# Patient Record
Sex: MALE | Race: BLACK OR AFRICAN AMERICAN | Employment: FULL TIME | URBAN - METROPOLITAN AREA
[De-identification: names, ages, dates, MRNs, and addresses within clinical notes are randomized per-mention and may not be internally consistent; named-entity substitution may affect disease eponyms.]

---

## 2017-03-03 PROBLEM — Z71.6 TOBACCO ABUSE COUNSELING: Status: ACTIVE | Noted: 2017-03-03

## 2017-03-03 PROBLEM — J44.9 CHRONIC OBSTRUCTIVE PULMONARY DISEASE (HCC): Status: ACTIVE | Noted: 2017-03-03

## 2017-03-03 PROBLEM — I10 ESSENTIAL HYPERTENSION: Status: ACTIVE | Noted: 2017-03-03

## 2017-03-03 PROBLEM — Z72.0 TOBACCO ABUSE DISORDER: Status: ACTIVE | Noted: 2017-03-03

## 2017-03-03 PROBLEM — F10.10 ALCOHOL USE DISORDER, MILD, ABUSE: Status: ACTIVE | Noted: 2017-03-03

## 2017-04-05 PROBLEM — I37.1 PULMONARY VALVE INSUFFICIENCY: Status: ACTIVE | Noted: 2017-04-05

## 2017-04-05 PROBLEM — R79.89 ELEVATED LFTS: Status: ACTIVE | Noted: 2017-04-05

## 2024-10-02 ENCOUNTER — OFFICE VISIT (OUTPATIENT)
Dept: INTERNAL MEDICINE CLINIC | Age: 44
End: 2024-10-02

## 2024-10-02 VITALS
BODY MASS INDEX: 19.77 KG/M2 | WEIGHT: 123 LBS | SYSTOLIC BLOOD PRESSURE: 126 MMHG | HEART RATE: 91 BPM | HEIGHT: 66 IN | OXYGEN SATURATION: 98 % | DIASTOLIC BLOOD PRESSURE: 78 MMHG

## 2024-10-02 DIAGNOSIS — R19.7 DIARRHEA, UNSPECIFIED TYPE: ICD-10-CM

## 2024-10-02 DIAGNOSIS — Z13.1 SCREENING FOR DIABETES MELLITUS: ICD-10-CM

## 2024-10-02 DIAGNOSIS — R53.83 OTHER FATIGUE: ICD-10-CM

## 2024-10-02 DIAGNOSIS — Z12.5 SCREENING PSA (PROSTATE SPECIFIC ANTIGEN): ICD-10-CM

## 2024-10-02 DIAGNOSIS — F10.10 ALCOHOL ABUSE: ICD-10-CM

## 2024-10-02 DIAGNOSIS — R39.15 URINARY URGENCY: ICD-10-CM

## 2024-10-02 DIAGNOSIS — Z13.220 SCREENING FOR HYPERLIPIDEMIA: Primary | ICD-10-CM

## 2024-10-02 DIAGNOSIS — R35.89 POLYURIA: ICD-10-CM

## 2024-10-02 SDOH — ECONOMIC STABILITY: INCOME INSECURITY: HOW HARD IS IT FOR YOU TO PAY FOR THE VERY BASICS LIKE FOOD, HOUSING, MEDICAL CARE, AND HEATING?: PATIENT DECLINED

## 2024-10-02 SDOH — ECONOMIC STABILITY: FOOD INSECURITY: WITHIN THE PAST 12 MONTHS, THE FOOD YOU BOUGHT JUST DIDN'T LAST AND YOU DIDN'T HAVE MONEY TO GET MORE.: PATIENT DECLINED

## 2024-10-02 SDOH — ECONOMIC STABILITY: FOOD INSECURITY: WITHIN THE PAST 12 MONTHS, YOU WORRIED THAT YOUR FOOD WOULD RUN OUT BEFORE YOU GOT MONEY TO BUY MORE.: SOMETIMES TRUE

## 2024-10-02 ASSESSMENT — PATIENT HEALTH QUESTIONNAIRE - PHQ9
SUM OF ALL RESPONSES TO PHQ QUESTIONS 1-9: 2
2. FEELING DOWN, DEPRESSED OR HOPELESS: SEVERAL DAYS
1. LITTLE INTEREST OR PLEASURE IN DOING THINGS: SEVERAL DAYS
SUM OF ALL RESPONSES TO PHQ QUESTIONS 1-9: 2
SUM OF ALL RESPONSES TO PHQ QUESTIONS 1-9: 2
SUM OF ALL RESPONSES TO PHQ9 QUESTIONS 1 & 2: 2
SUM OF ALL RESPONSES TO PHQ QUESTIONS 1-9: 2

## 2024-10-02 NOTE — PROGRESS NOTES
[facility]  [address] [address]  Dept: [phone]  Dept Fax: [phone]     Name: Gerard Wadsworth  : 1980           Chief Complaint   Patient presents with    New Patient    Urinary Urgency     Urgency/Frequency present for 2 years, no pain    Urinary Frequency    GI Problem     Diarrhea for about a year 3/4 times a day        History of Present Illness:    HPI  Patient came to establish care  Patient complains of urinary urgency and frequency from last 2 years, no fever or chills,  Also complaining of nocturia  Will check PSA,    Does have family history of cancer does not know what kind of cancer   Not in contact    Also having diarrhea 3-4 bowel movements daily, no abdominal pain no weight loss, no blood in the stool, going on from last year, gets cramping followed by diarrhea,    Drinks alcohol daily, does not know if he goes in withdrawal, since he drinks every day,      Past Medical History:    Past Medical History:   Diagnosis Date    Tobacco abuse       Reviewed all health maintenance requirements and ordered appropriate tests  Health Maintenance Due   Topic Date Due    Pneumococcal 0-64 years Vaccine (1 of 2 - PCV) Never done    Depression Screen  Never done    Varicella vaccine (1 of 2 - 13+ 2-dose series) Never done    HIV screen  Never done    Hepatitis C screen  Never done    Hepatitis B vaccine (1 of 3 - 19+ 3-dose series) Never done    DTaP/Tdap/Td vaccine (1 - Tdap) Never done    Lipids  2022    Flu vaccine (1) Never done    COVID-19 Vaccine ( - -24 season) Never done       Past Surgical History:    No past surgical history on file.     Medications:      Current Outpatient Medications:     vitamin D (ERGOCALCIFEROL) 11246 UNITS CAPS capsule, Take 1 capsule by mouth once a week (Patient not taking: Reported on 10/2/2024), Disp: 4 capsule, Rfl: 5    varenicline (CHANTIX CONTINUING MONTH ) 1 MG tablet, Take 1 tablet by

## 2024-10-02 NOTE — PROGRESS NOTES
\"Have you been to the ER, urgent care clinic since your last visit?  Hospitalized since your last visit?\"    NO

## 2024-10-09 ENCOUNTER — HOSPITAL ENCOUNTER (OUTPATIENT)
Age: 44
Setting detail: SPECIMEN
Discharge: HOME | End: 2024-10-09

## 2024-10-09 DIAGNOSIS — Z13.1 SCREENING FOR DIABETES MELLITUS: ICD-10-CM

## 2024-10-09 DIAGNOSIS — Z13.220 SCREENING FOR HYPERLIPIDEMIA: ICD-10-CM

## 2024-10-09 DIAGNOSIS — R53.83 OTHER FATIGUE: ICD-10-CM

## 2024-10-09 DIAGNOSIS — Z12.5 SCREENING PSA (PROSTATE SPECIFIC ANTIGEN): ICD-10-CM

## 2024-10-09 DIAGNOSIS — R39.15 URINARY URGENCY: ICD-10-CM

## 2024-10-09 DIAGNOSIS — R19.7 DIARRHEA, UNSPECIFIED TYPE: ICD-10-CM

## 2024-10-09 DIAGNOSIS — R35.89 POLYURIA: ICD-10-CM

## 2024-10-09 DIAGNOSIS — F10.10 ALCOHOL ABUSE: ICD-10-CM

## 2024-10-09 LAB
ALBUMIN SERPL-MCNC: 4.9 G/DL (ref 3.5–5.2)
ALBUMIN/GLOB SERPL: 2 {RATIO} (ref 1–2.5)
ALP SERPL-CCNC: 46 U/L (ref 40–129)
ALT SERPL-CCNC: 24 U/L (ref 10–50)
ANION GAP SERPL CALCULATED.3IONS-SCNC: 13 MMOL/L (ref 9–16)
AST SERPL-CCNC: 35 U/L (ref 10–50)
BASOPHILS # BLD: <0.03 K/UL (ref 0–0.2)
BASOPHILS NFR BLD: 0 % (ref 0–2)
BILIRUB SERPL-MCNC: 0.6 MG/DL (ref 0–1.2)
BUN SERPL-MCNC: 14 MG/DL (ref 6–20)
CALCIUM SERPL-MCNC: 10.1 MG/DL (ref 8.6–10.4)
CHLORIDE SERPL-SCNC: 103 MMOL/L (ref 98–107)
CHOLEST SERPL-MCNC: 156 MG/DL (ref 0–199)
CHOLESTEROL/HDL RATIO: 2
CO2 SERPL-SCNC: 25 MMOL/L (ref 20–31)
CREAT SERPL-MCNC: 1.2 MG/DL (ref 0.7–1.2)
EOSINOPHIL # BLD: 0.03 K/UL (ref 0–0.44)
EOSINOPHILS RELATIVE PERCENT: 1 % (ref 1–4)
ERYTHROCYTE [DISTWIDTH] IN BLOOD BY AUTOMATED COUNT: 12.1 % (ref 11.8–14.4)
FOLATE SERPL-MCNC: 11.4 NG/ML (ref 4.8–24.2)
GFR, ESTIMATED: 78 ML/MIN/1.73M2
GLUCOSE P FAST SERPL-MCNC: 75 MG/DL (ref 74–99)
GLUCOSE SERPL-MCNC: 75 MG/DL (ref 74–99)
HCT VFR BLD AUTO: 45 % (ref 40.7–50.3)
HDLC SERPL-MCNC: 70 MG/DL
HGB BLD-MCNC: 15.1 G/DL (ref 13–17)
IMM GRANULOCYTES # BLD AUTO: <0.03 K/UL (ref 0–0.3)
IMM GRANULOCYTES NFR BLD: 0 %
LDLC SERPL CALC-MCNC: 78 MG/DL (ref 0–100)
LYMPHOCYTES NFR BLD: 2.8 K/UL (ref 1.1–3.7)
LYMPHOCYTES RELATIVE PERCENT: 50 % (ref 24–43)
MCH RBC QN AUTO: 31 PG (ref 25.2–33.5)
MCHC RBC AUTO-ENTMCNC: 33.6 G/DL (ref 28.4–34.8)
MCV RBC AUTO: 92.4 FL (ref 82.6–102.9)
MONOCYTES NFR BLD: 0.5 K/UL (ref 0.1–1.2)
MONOCYTES NFR BLD: 9 % (ref 3–12)
NEUTROPHILS NFR BLD: 40 % (ref 36–65)
NEUTS SEG NFR BLD: 2.21 K/UL (ref 1.5–8.1)
NRBC BLD-RTO: 0 PER 100 WBC
PLATELET # BLD AUTO: 199 K/UL (ref 138–453)
PMV BLD AUTO: 9 FL (ref 8.1–13.5)
POTASSIUM SERPL-SCNC: 4.5 MMOL/L (ref 3.7–5.3)
PROT SERPL-MCNC: 7.6 G/DL (ref 6.6–8.7)
RBC # BLD AUTO: 4.87 M/UL (ref 4.21–5.77)
SODIUM SERPL-SCNC: 141 MMOL/L (ref 136–145)
TRIGL SERPL-MCNC: 42 MG/DL (ref 0–149)
TSH SERPL DL<=0.05 MIU/L-ACNC: 1.34 UIU/ML (ref 0.27–4.2)
VIT B12 SERPL-MCNC: 642 PG/ML (ref 232–1245)
VLDLC SERPL CALC-MCNC: 8 MG/DL
WBC OTHER # BLD: 5.6 K/UL (ref 3.5–11.3)

## 2024-10-10 LAB
CAMPYLOBACTER DNA SPEC NAA+PROBE: NORMAL
ETEC ELTA+ESTB GENES STL QL NAA+PROBE: NORMAL
LACTOFERRIN STL QL: NORMAL
P SHIGELLOIDES DNA STL QL NAA+PROBE: NORMAL
SALMONELLA DNA SPEC QL NAA+PROBE: NORMAL
SHIGA TOXIN STX GENE SPEC NAA+PROBE: NORMAL
SHIGELLA DNA SPEC QL NAA+PROBE: NORMAL
SPECIMEN DESCRIPTION: NORMAL
V CHOL+PARA RFBL+TRKH+TNAA STL QL NAA+PR: NORMAL
Y ENTERO RECN STL QL NAA+PROBE: NORMAL

## 2024-10-11 LAB — FECAL PANCREATIC ELASTASE-1: 345 UG/G

## 2024-10-13 LAB
GLIADIN IGA SER IA-ACNC: 4.5 U/ML
GLIADIN IGG SER IA-ACNC: 3.1 U/ML
IGA SERPL-MCNC: 306 MG/DL (ref 70–400)
TTG IGA SER IA-ACNC: 0.6 U/ML

## 2024-10-30 ENCOUNTER — OFFICE VISIT (OUTPATIENT)
Dept: INTERNAL MEDICINE CLINIC | Age: 44
End: 2024-10-30
Payer: COMMERCIAL

## 2024-10-30 VITALS
OXYGEN SATURATION: 98 % | SYSTOLIC BLOOD PRESSURE: 126 MMHG | HEART RATE: 72 BPM | WEIGHT: 121 LBS | BODY MASS INDEX: 19.44 KG/M2 | DIASTOLIC BLOOD PRESSURE: 82 MMHG | HEIGHT: 66 IN

## 2024-10-30 DIAGNOSIS — R10.84 GENERALIZED ABDOMINAL PAIN: ICD-10-CM

## 2024-10-30 DIAGNOSIS — R39.15 URINARY URGENCY: Primary | ICD-10-CM

## 2024-10-30 DIAGNOSIS — R63.4 WEIGHT LOSS: ICD-10-CM

## 2024-10-30 PROCEDURE — 99213 OFFICE O/P EST LOW 20 MIN: CPT | Performed by: INTERNAL MEDICINE

## 2024-10-30 PROCEDURE — 3079F DIAST BP 80-89 MM HG: CPT | Performed by: INTERNAL MEDICINE

## 2024-10-30 PROCEDURE — 3074F SYST BP LT 130 MM HG: CPT | Performed by: INTERNAL MEDICINE

## 2024-10-30 NOTE — PROGRESS NOTES
\"Have you been to the ER, urgent care clinic since your last visit?  Hospitalized since your last visit?\"    NO    “Have you seen or consulted any other health care providers outside our system since your last visit?”    NO            
motion intact,  Cardiovascular : Regular rate and rhythm, no murmur appreciated  Respiratory : Bilateral clear breath sounds, no wheezing crackles appreciated  Gastrointestinal  : Abdomen soft, nontender, bowel sounds present  Extremities : No pedal edema clubbing or cyanosis present  Skin : Warm and dry, no skin lesions appreciated  Psych : Mood normal       Lab Results   Component Value Date    LABA1C 5.3 03/07/2017     No results found for: \"EAG\"   LABORATORY FINDINGS:    CBC:  Lab Results   Component Value Date/Time    WBC 5.6 10/09/2024 04:20 PM    HGB 15.1 10/09/2024 04:20 PM     10/09/2024 04:20 PM       BMP:    Lab Results   Component Value Date/Time     10/09/2024 04:20 PM    K 4.5 10/09/2024 04:20 PM     10/09/2024 04:20 PM    CO2 25 10/09/2024 04:20 PM    BUN 14 10/09/2024 04:20 PM    CREATININE 1.2 10/09/2024 04:20 PM    GLUCOSE 75 10/09/2024 04:20 PM       HEMOGLOBIN A1C:   Lab Results   Component Value Date/Time    LABA1C 5.3 03/07/2017 12:00 AM       FASTING LIPID PANEL:  Lab Results   Component Value Date    CHOL 170 03/07/2017    HDL 70 10/09/2024    TRIG 41 03/07/2017       No results found for: \"TSHREFFT4\"     No valid procedures specified.     ASSESSMENT AND PLAN:      Diagnoses and all orders for this visit:  Urinary urgency  -     Urinalysis with Reflex to Culture; Future  -     PSA Screening; Future  Generalized abdominal pain  -     CT ABDOMEN PELVIS WO CONTRAST Additional Contrast? None; Future  Weight loss  -     Urinalysis with Reflex to Culture; Future  -     PSA Screening; Future  -     CT ABDOMEN PELVIS WO CONTRAST Additional Contrast? None; Future       FOLLOW UP AND INSTRUCTIONS:   No follow-ups on file.    Gerard received counseling on the following healthy behaviors: exercise    Discussed use, benefit, and side effects of prescribed medications.  Barriers to medication compliance addressed.  All patient questions answered.  Pt voiced understanding.     Patient

## 2024-11-11 ENCOUNTER — OFFICE VISIT (OUTPATIENT)
Dept: GASTROENTEROLOGY | Age: 44
End: 2024-11-11
Payer: COMMERCIAL

## 2024-11-11 VITALS
DIASTOLIC BLOOD PRESSURE: 102 MMHG | HEIGHT: 66 IN | SYSTOLIC BLOOD PRESSURE: 147 MMHG | WEIGHT: 123 LBS | BODY MASS INDEX: 19.77 KG/M2 | HEART RATE: 65 BPM

## 2024-11-11 DIAGNOSIS — R14.0 ABDOMINAL BLOATING: Primary | ICD-10-CM

## 2024-11-11 DIAGNOSIS — R19.4 ALTERED BOWEL HABITS: ICD-10-CM

## 2024-11-11 PROCEDURE — 3080F DIAST BP >= 90 MM HG: CPT | Performed by: INTERNAL MEDICINE

## 2024-11-11 PROCEDURE — 99204 OFFICE O/P NEW MOD 45 MIN: CPT | Performed by: INTERNAL MEDICINE

## 2024-11-11 PROCEDURE — 3077F SYST BP >= 140 MM HG: CPT | Performed by: INTERNAL MEDICINE

## 2024-11-11 RX ORDER — POLYETHYLENE GLYCOL 3350 17 G/17G
POWDER, FOR SOLUTION ORAL
Qty: 255 G | Refills: 0 | Status: SHIPPED | OUTPATIENT
Start: 2024-11-11

## 2024-11-11 NOTE — PROGRESS NOTES
Reason for Referral:   Pennie Stevenson MD  [address] [place] [address]    Chief Complaint   Patient presents with    New Patient           HISTORY OF PRESENT ILLNESS: Mr.Reginald TRUNG Wadsworth is a 44 y.o. male with a no significant past history, referred for evaluation of altered bowel habits.    He is c/o- abdominal discomfort with bloating for several years. Also c/o- frequent BM- 3 times a day.  He drinks alcohol daily- 6-packs/ day and 12 pack on weekends.  Also smokes 3 cigars everyday.    No c/o- nausea, vomiting, fever, loss of appetite, weight loss, bloating, bleeding NV, diarrhea, nocturnal diarrhea, tenesmus      Past Medical,Family, and Social History reviewed and does not contribute to the patient presentingcondition.     Pan kelley's PMH/PSH,SH,PSYCH Hx, MEDs, ALLERGIES, and ROS were all reviewed and updated in the appropriate sections.    PAST MEDICAL HISTORY:  Past Medical History:   Diagnosis Date    Tobacco abuse 2000       No past surgical history on file.    CURRENT MEDICATIONS:    Current Outpatient Medications:     vitamin D (ERGOCALCIFEROL) 98111 UNITS CAPS capsule, Take 1 capsule by mouth once a week (Patient not taking: Reported on 10/2/2024), Disp: 4 capsule, Rfl: 5    varenicline (CHANTIX CONTINUING MONTH PAK) 1 MG tablet, Take 1 tablet by mouth 2 times daily (Patient not taking: Reported on 10/2/2024), Disp: 60 tablet, Rfl: 4    vitamin B-1 (THIAMINE) 100 MG tablet, Take 1 tablet by mouth daily (Patient not taking: Reported on 10/2/2024), Disp: 30 tablet, Rfl: 11    ALLERGIES:   No Known Allergies    FAMILY HISTORY:       Problem Relation Age of Onset    High Blood Pressure Father     Alcohol Abuse Father     Stroke Paternal Aunt     Cancer Maternal Grandfather     Cancer Maternal Uncle     Cancer Paternal Aunt          SOCIAL HISTORY:   Social History     Socioeconomic History    Marital status: Single     Spouse name: Not on file    Number of children: Not on file    Years of